# Patient Record
Sex: FEMALE | Race: WHITE | Employment: OTHER | URBAN - METROPOLITAN AREA
[De-identification: names, ages, dates, MRNs, and addresses within clinical notes are randomized per-mention and may not be internally consistent; named-entity substitution may affect disease eponyms.]

---

## 2017-11-17 ENCOUNTER — OFFICE VISIT (OUTPATIENT)
Dept: ENT CLINIC | Age: 67
End: 2017-11-17

## 2017-11-17 VITALS
WEIGHT: 169.2 LBS | DIASTOLIC BLOOD PRESSURE: 59 MMHG | BODY MASS INDEX: 27.19 KG/M2 | SYSTOLIC BLOOD PRESSURE: 104 MMHG | HEIGHT: 66 IN | HEART RATE: 82 BPM

## 2017-11-17 DIAGNOSIS — H83.09 LABYRINTHITIS, UNSPECIFIED LATERALITY: Primary | ICD-10-CM

## 2017-11-17 PROCEDURE — 1090F PRES/ABSN URINE INCON ASSESS: CPT | Performed by: OTOLARYNGOLOGY

## 2017-11-17 PROCEDURE — 99203 OFFICE O/P NEW LOW 30 MIN: CPT | Performed by: OTOLARYNGOLOGY

## 2017-11-17 PROCEDURE — G8419 CALC BMI OUT NRM PARAM NOF/U: HCPCS | Performed by: OTOLARYNGOLOGY

## 2017-11-17 PROCEDURE — G8427 DOCREV CUR MEDS BY ELIG CLIN: HCPCS | Performed by: OTOLARYNGOLOGY

## 2017-11-17 PROCEDURE — G8400 PT W/DXA NO RESULTS DOC: HCPCS | Performed by: OTOLARYNGOLOGY

## 2017-11-17 PROCEDURE — 1123F ACP DISCUSS/DSCN MKR DOCD: CPT | Performed by: OTOLARYNGOLOGY

## 2017-11-17 PROCEDURE — 4040F PNEUMOC VAC/ADMIN/RCVD: CPT | Performed by: OTOLARYNGOLOGY

## 2017-11-17 PROCEDURE — 3014F SCREEN MAMMO DOC REV: CPT | Performed by: OTOLARYNGOLOGY

## 2017-11-17 PROCEDURE — 1036F TOBACCO NON-USER: CPT | Performed by: OTOLARYNGOLOGY

## 2017-11-17 PROCEDURE — G8484 FLU IMMUNIZE NO ADMIN: HCPCS | Performed by: OTOLARYNGOLOGY

## 2017-11-17 PROCEDURE — 3017F COLORECTAL CA SCREEN DOC REV: CPT | Performed by: OTOLARYNGOLOGY

## 2017-11-17 RX ORDER — METHYLPREDNISOLONE 4 MG/1
TABLET ORAL
Qty: 1 KIT | Refills: 0 | Status: SHIPPED | OUTPATIENT
Start: 2017-11-17 | End: 2017-11-23

## 2017-11-17 NOTE — PROGRESS NOTES
The patient is a 71-year-old recently treated for urinary tract infection with Bactrim. There had been some associated respiratory infection such as green postnasal drainage. The symptoms resolve but she is now left with a sore sensation in the throat associated with lightheadedness. There is been no loss of consciousness but rather sensation of autophony. She denies any significant room spinning vertigo, change in vision, severe headache. The patient is a non smoker and is not exposed to second hand smoke or irritants. There have been no known contagious contacts. There are  no other symptoms referable to the upper aerodigestive tract. Her current medications include Lipitor, Prozac, Aldactone    Review of systems is negative except for the history of the present illness    GENERAL:   The patient appears well developed and well nourished. The head is normocephalic and atraumatic; no facial scars or weakness are noted. The facial skeleton is normal.The voice has a normal quality and tonality to it. EARS:  The pinnae are normal bilaterally. The ear canals are clear with no cerumen or narrowing. Both eardrums are normal with good aeration of the middle ear space. There is no evidence of attic retraction pocket or cholesteatoma. The umbo and light reflex appear normal.     EYES:   The conjunctivae and lids appear normal. There is full extraocular movement                   JAWS/DENTITION:  The dentition is grossly normal, including the gingiva. SALIVARY GLANDS:  The parotid and submandibular glands are normal in appearance. There are no palpable stones or masses. Clear secretions emanate from both Derick's and [place]'s ducts. There is no periglandular adenopathy. NASAL:  The external nose including the dorsum, columella, alae, and nasion is unremarkable. The turbinates appear normal. The middle and inferior meatuses appeared clear.  No polyps, ulceration, or mass are visualized either naris. The nasal septum is midline    NASOPHARYNX:  The mirror exam of the nasopharynx shows no mucosal disease or obstruction. ORAL/PHARYNGEAL:   No abnormal dryness or discrete mucosal lesions are seen at the lips, oral cavity, or oropharynx. There is full tongue mobility, and the fungiform and vallate papillae appear normal. The floor of the mouth is unremarkable. . The palatoglossal and palatopharyngeal folds, uvula, and soft palate do not obstruct the oropharynx. NECK:  The neck is supple with full range of motion. The bony and cartilaginous landmarks are normal to palpation. There is no suspicious mass or adenopathy. The thyroid gland is normal to palpation, and the trachea is midline. CHEST/PULMONARY: Effort normal, no stridor. Not tachypneic. No respiratory distress    NEURO: The patient is alert and oriented. The cranial nerves are intact. SKIN: Warm and dry; no pallor    PSYCHIATRIC: Psychiatric: There is a normal mood and affect.  Behavior is normal. Judgment and thought content normal.     Impression:  No suggestion of acute respiratory infection  Suspicion for labyrinthitis, possibly viral    Plan:  Adequate rest and fluids discussed  Medrol Dosepak  Check back in 1 week for follow-up

## 2017-11-21 ENCOUNTER — TELEPHONE (OUTPATIENT)
Dept: ENT CLINIC | Age: 67
End: 2017-11-21

## 2017-11-21 NOTE — TELEPHONE ENCOUNTER
1.  These are not symptoms that I should treat without examining her    2.   Since there was tightness in her chest, it might be best to check with her primary care physician in this regard

## 2017-11-21 NOTE — TELEPHONE ENCOUNTER
Patient was seen in office last Friday 11/17/17. .patient was prescribed a medrol pack. Patient called today for an update to let us know that her sore throat is better but she now has green mucus, headaches, chest tightness, cough, and bilateral ear pain. Please advise. Rafa Moon  Pharmacy confirmed in chart

## 2017-11-21 NOTE — TELEPHONE ENCOUNTER
I spoke to pt and advice her :    1. These are not symptoms that I should treat without examining her     2.   Since there was tightness in her chest, it might be best to check with her primary care physician in this regard    Patient agreed

## 2018-03-23 PROBLEM — K57.32 DIVERTICULITIS LARGE INTESTINE W/O PERFORATION OR ABSCESS W/O BLEEDING: Status: ACTIVE | Noted: 2018-03-23

## 2018-06-20 ENCOUNTER — OFFICE VISIT (OUTPATIENT)
Dept: ENT CLINIC | Age: 68
End: 2018-06-20

## 2018-06-20 VITALS — SYSTOLIC BLOOD PRESSURE: 120 MMHG | DIASTOLIC BLOOD PRESSURE: 70 MMHG

## 2018-06-20 DIAGNOSIS — H81.09 LABYRINTHINE VERTIGO, UNSPECIFIED LATERALITY: ICD-10-CM

## 2018-06-20 DIAGNOSIS — M79.2 NEURITIS: Primary | ICD-10-CM

## 2018-06-20 PROCEDURE — 3017F COLORECTAL CA SCREEN DOC REV: CPT | Performed by: OTOLARYNGOLOGY

## 2018-06-20 PROCEDURE — 1123F ACP DISCUSS/DSCN MKR DOCD: CPT | Performed by: OTOLARYNGOLOGY

## 2018-06-20 PROCEDURE — G8419 CALC BMI OUT NRM PARAM NOF/U: HCPCS | Performed by: OTOLARYNGOLOGY

## 2018-06-20 PROCEDURE — G8400 PT W/DXA NO RESULTS DOC: HCPCS | Performed by: OTOLARYNGOLOGY

## 2018-06-20 PROCEDURE — 1090F PRES/ABSN URINE INCON ASSESS: CPT | Performed by: OTOLARYNGOLOGY

## 2018-06-20 PROCEDURE — 1036F TOBACCO NON-USER: CPT | Performed by: OTOLARYNGOLOGY

## 2018-06-20 PROCEDURE — 4040F PNEUMOC VAC/ADMIN/RCVD: CPT | Performed by: OTOLARYNGOLOGY

## 2018-06-20 PROCEDURE — 99214 OFFICE O/P EST MOD 30 MIN: CPT | Performed by: OTOLARYNGOLOGY

## 2018-06-20 PROCEDURE — G8427 DOCREV CUR MEDS BY ELIG CLIN: HCPCS | Performed by: OTOLARYNGOLOGY

## 2018-06-20 RX ORDER — GABAPENTIN 300 MG/1
300 CAPSULE ORAL 2 TIMES DAILY
Qty: 30 CAPSULE | Refills: 0 | Status: SHIPPED | OUTPATIENT
Start: 2018-06-20 | End: 2020-01-13

## 2020-01-13 ENCOUNTER — OFFICE VISIT (OUTPATIENT)
Dept: ENT CLINIC | Age: 70
End: 2020-01-13
Payer: MEDICARE

## 2020-01-13 VITALS — HEART RATE: 88 BPM | DIASTOLIC BLOOD PRESSURE: 62 MMHG | OXYGEN SATURATION: 96 % | SYSTOLIC BLOOD PRESSURE: 100 MMHG

## 2020-01-13 PROCEDURE — G8400 PT W/DXA NO RESULTS DOC: HCPCS | Performed by: OTOLARYNGOLOGY

## 2020-01-13 PROCEDURE — 3017F COLORECTAL CA SCREEN DOC REV: CPT | Performed by: OTOLARYNGOLOGY

## 2020-01-13 PROCEDURE — 4040F PNEUMOC VAC/ADMIN/RCVD: CPT | Performed by: OTOLARYNGOLOGY

## 2020-01-13 PROCEDURE — G8421 BMI NOT CALCULATED: HCPCS | Performed by: OTOLARYNGOLOGY

## 2020-01-13 PROCEDURE — 1123F ACP DISCUSS/DSCN MKR DOCD: CPT | Performed by: OTOLARYNGOLOGY

## 2020-01-13 PROCEDURE — G8484 FLU IMMUNIZE NO ADMIN: HCPCS | Performed by: OTOLARYNGOLOGY

## 2020-01-13 PROCEDURE — G8427 DOCREV CUR MEDS BY ELIG CLIN: HCPCS | Performed by: OTOLARYNGOLOGY

## 2020-01-13 PROCEDURE — 99213 OFFICE O/P EST LOW 20 MIN: CPT | Performed by: OTOLARYNGOLOGY

## 2020-01-13 PROCEDURE — 1090F PRES/ABSN URINE INCON ASSESS: CPT | Performed by: OTOLARYNGOLOGY

## 2020-01-13 PROCEDURE — 1036F TOBACCO NON-USER: CPT | Performed by: OTOLARYNGOLOGY

## 2020-01-13 RX ORDER — METHYLPREDNISOLONE 4 MG/1
4 TABLET ORAL SEE ADMIN INSTRUCTIONS
Qty: 1 KIT | Refills: 0 | Status: SHIPPED | OUTPATIENT
Start: 2020-01-13 | End: 2020-09-17 | Stop reason: ALTCHOICE

## 2020-01-13 RX ORDER — AZITHROMYCIN 250 MG/1
TABLET, FILM COATED ORAL
Qty: 1 PACKET | Refills: 0 | Status: SHIPPED | OUTPATIENT
Start: 2020-01-13 | End: 2020-09-17 | Stop reason: ALTCHOICE

## 2020-01-13 RX ORDER — BUTALBITAL, ACETAMINOPHEN AND CAFFEINE 50; 325; 40 MG/1; MG/1; MG/1
1 TABLET ORAL EVERY 4 HOURS PRN
Qty: 30 TABLET | Refills: 0 | Status: SHIPPED | OUTPATIENT
Start: 2020-01-13

## 2020-01-24 ENCOUNTER — TELEPHONE (OUTPATIENT)
Dept: ENT CLINIC | Age: 70
End: 2020-01-24

## 2020-01-24 RX ORDER — AMOXICILLIN AND CLAVULANATE POTASSIUM 875; 125 MG/1; MG/1
1 TABLET, FILM COATED ORAL 2 TIMES DAILY
Qty: 20 TABLET | Refills: 0 | Status: SHIPPED | OUTPATIENT
Start: 2020-01-24 | End: 2020-09-17 | Stop reason: ALTCHOICE

## 2020-01-24 NOTE — TELEPHONE ENCOUNTER
Patient called with an update from her antibiotic and steroids. Patient says her right ear is still very painful.  Please advise

## 2020-09-17 ENCOUNTER — OFFICE VISIT (OUTPATIENT)
Dept: ORTHOPEDIC SURGERY | Age: 70
End: 2020-09-17
Payer: MEDICARE

## 2020-09-17 VITALS — HEIGHT: 66 IN | TEMPERATURE: 97.5 F | BODY MASS INDEX: 25.71 KG/M2 | WEIGHT: 160 LBS

## 2020-09-17 PROCEDURE — 1090F PRES/ABSN URINE INCON ASSESS: CPT | Performed by: PODIATRIST

## 2020-09-17 PROCEDURE — 99203 OFFICE O/P NEW LOW 30 MIN: CPT | Performed by: PODIATRIST

## 2020-09-17 PROCEDURE — G8400 PT W/DXA NO RESULTS DOC: HCPCS | Performed by: PODIATRIST

## 2020-09-17 PROCEDURE — G8427 DOCREV CUR MEDS BY ELIG CLIN: HCPCS | Performed by: PODIATRIST

## 2020-09-17 PROCEDURE — G8417 CALC BMI ABV UP PARAM F/U: HCPCS | Performed by: PODIATRIST

## 2020-09-17 PROCEDURE — 3017F COLORECTAL CA SCREEN DOC REV: CPT | Performed by: PODIATRIST

## 2020-09-17 PROCEDURE — 1036F TOBACCO NON-USER: CPT | Performed by: PODIATRIST

## 2020-09-17 PROCEDURE — L3260 AMBULATORY SURGICAL BOOT EAC: HCPCS | Performed by: PODIATRIST

## 2020-09-17 PROCEDURE — 1123F ACP DISCUSS/DSCN MKR DOCD: CPT | Performed by: PODIATRIST

## 2020-09-17 PROCEDURE — 4040F PNEUMOC VAC/ADMIN/RCVD: CPT | Performed by: PODIATRIST

## 2020-09-17 RX ORDER — PREDNISONE 10 MG/1
TABLET ORAL
Qty: 26 TABLET | Refills: 0 | Status: SHIPPED | OUTPATIENT
Start: 2020-09-17

## 2020-09-17 NOTE — PROGRESS NOTES
HISTORY OF PRESENT ILLNESS:  This is an intial visit for a 68-year-old female with a chief complaint of pain to the top of the left foot. She has been having pain for the past several years on and off but within the last 6 months it has become much more consistent. She has her greatest pain typically at night and as the day progresses. No history of trauma is related. The pain is worsened with activity and relieved with rest. It is described as mostly a dull achy type pain but can be sharper at times. She has tried Tylenol for this but it has not provided any long-lasting relief. She also complains of occasional pain to her left foot bunion. She has had that for several years. She states that it creates a burning type pain. FAMILY HISTORY:  Documented in chart. SOCIAL HISTORY:  Documented in chart. REVIEW OF SYSTEMS: The patient denies any fever, chills, or night sweats. The patient also denies developing any type of rash. The patient denies any problems with cardiovascular, pulmonary, gastrointestinal, neurologic, urologic, genitourinary, psychiatric, dermatologic, and HEENT systems. PHYSICAL EXAM:  The majority of palpable tenderness is over the dorsal central aspect of the left midfoot. There is very mild edema present. There is no erythema or ecchymosis present. She has a prominent hallux valgus deformity of the left foot. There is mild palpable tenderness at the medial eminence of the first MTP. The patient has palpable pedal pulses bilateral.  The sensation is grossly intact bilateral.    X-RAYS: 3 weightbearing views of the left foot were obtained. Severe osteoarthritic changes are noted at the TMT joint level. This is mainly around the second TMT joint. She has moderate to severe increase in the first metatarsal angle with moderate hallux valgus. ASSESSMENT:  Midfoot synovitis and Osteoarthritis, left foot. Hallux valgus, left foot.     PLAN:  I educated the patient on the pathology and its treatment options. A postop shoe and temporary arch support was applied to the patient's left foot. All weightbearing activity is to be performed in the boot. Overall activity is to be decreased. A prednisone 10 mg taper was prescribed for the short-term. I explained to the patient that this is essentially an overuse type injury secondary to the activity level, foot function, and weight. I discussed the recurrent nature of the episodes of pain and chronic nature of this problem. Both short and long term treatment was discussed. I'll see him back in approximately 3 weeks for reevaluation. Procedures    Darco Post-op Shoe Brace     Patient was prescribed a Darco Post-Op Shoe. The left foot will require stabilization / immobilization from this semi-rigid / rigid orthosis to improve their function. The orthosis will assist in protecting the affected area, provide functional support and facilitate healing. Patient was instructed to progress ambulation weight bearing as tolerated in the device. The patient was educated and fit by a healthcare professional with expert knowledge and specialization in brace application while under the direct supervision of the treating physician. Verbal and written instructions for the use of and application of this item were provided. They were instructed to contact the office immediately should the brace result in increased pain, decreased sensation, increased swelling or worsening of the condition.

## 2020-10-08 ENCOUNTER — OFFICE VISIT (OUTPATIENT)
Dept: ORTHOPEDIC SURGERY | Age: 70
End: 2020-10-08
Payer: MEDICARE

## 2020-10-08 VITALS — HEIGHT: 65 IN | WEIGHT: 165 LBS | BODY MASS INDEX: 27.49 KG/M2

## 2020-10-08 PROCEDURE — 1123F ACP DISCUSS/DSCN MKR DOCD: CPT | Performed by: PODIATRIST

## 2020-10-08 PROCEDURE — G8484 FLU IMMUNIZE NO ADMIN: HCPCS | Performed by: PODIATRIST

## 2020-10-08 PROCEDURE — 1036F TOBACCO NON-USER: CPT | Performed by: PODIATRIST

## 2020-10-08 PROCEDURE — G8427 DOCREV CUR MEDS BY ELIG CLIN: HCPCS | Performed by: PODIATRIST

## 2020-10-08 PROCEDURE — G8417 CALC BMI ABV UP PARAM F/U: HCPCS | Performed by: PODIATRIST

## 2020-10-08 PROCEDURE — G8400 PT W/DXA NO RESULTS DOC: HCPCS | Performed by: PODIATRIST

## 2020-10-08 PROCEDURE — 3017F COLORECTAL CA SCREEN DOC REV: CPT | Performed by: PODIATRIST

## 2020-10-08 PROCEDURE — L3040 FT ARCH SUPRT PREMOLD LONGIT: HCPCS | Performed by: PODIATRIST

## 2020-10-08 PROCEDURE — 4040F PNEUMOC VAC/ADMIN/RCVD: CPT | Performed by: PODIATRIST

## 2020-10-08 PROCEDURE — 99213 OFFICE O/P EST LOW 20 MIN: CPT | Performed by: PODIATRIST

## 2020-10-08 PROCEDURE — 1090F PRES/ABSN URINE INCON ASSESS: CPT | Performed by: PODIATRIST

## 2021-07-08 ENCOUNTER — OFFICE VISIT (OUTPATIENT)
Dept: ENT CLINIC | Age: 71
End: 2021-07-08
Payer: MEDICARE

## 2021-07-08 VITALS — DIASTOLIC BLOOD PRESSURE: 63 MMHG | SYSTOLIC BLOOD PRESSURE: 110 MMHG | HEART RATE: 80 BPM

## 2021-07-08 DIAGNOSIS — R42 DIZZINESS: ICD-10-CM

## 2021-07-08 DIAGNOSIS — M26.609 TMJ (TEMPOROMANDIBULAR JOINT SYNDROME): Chronic | ICD-10-CM

## 2021-07-08 DIAGNOSIS — J01.90 ACUTE RHINOSINUSITIS: Primary | Chronic | ICD-10-CM

## 2021-07-08 DIAGNOSIS — H92.01 OTALGIA OF RIGHT EAR: Chronic | ICD-10-CM

## 2021-07-08 DIAGNOSIS — R49.0 HOARSENESS OF VOICE: Chronic | ICD-10-CM

## 2021-07-08 PROCEDURE — G8400 PT W/DXA NO RESULTS DOC: HCPCS | Performed by: OTOLARYNGOLOGY

## 2021-07-08 PROCEDURE — 4040F PNEUMOC VAC/ADMIN/RCVD: CPT | Performed by: OTOLARYNGOLOGY

## 2021-07-08 PROCEDURE — 1090F PRES/ABSN URINE INCON ASSESS: CPT | Performed by: OTOLARYNGOLOGY

## 2021-07-08 PROCEDURE — G8417 CALC BMI ABV UP PARAM F/U: HCPCS | Performed by: OTOLARYNGOLOGY

## 2021-07-08 PROCEDURE — 1123F ACP DISCUSS/DSCN MKR DOCD: CPT | Performed by: OTOLARYNGOLOGY

## 2021-07-08 PROCEDURE — G8427 DOCREV CUR MEDS BY ELIG CLIN: HCPCS | Performed by: OTOLARYNGOLOGY

## 2021-07-08 PROCEDURE — 99214 OFFICE O/P EST MOD 30 MIN: CPT | Performed by: OTOLARYNGOLOGY

## 2021-07-08 PROCEDURE — 3017F COLORECTAL CA SCREEN DOC REV: CPT | Performed by: OTOLARYNGOLOGY

## 2021-07-08 PROCEDURE — 1036F TOBACCO NON-USER: CPT | Performed by: OTOLARYNGOLOGY

## 2021-07-08 RX ORDER — CEFUROXIME AXETIL 250 MG/1
250 TABLET ORAL 2 TIMES DAILY
Qty: 28 TABLET | Refills: 0 | Status: SHIPPED | OUTPATIENT
Start: 2021-07-08 | End: 2021-07-22

## 2021-07-08 RX ORDER — CLONAZEPAM 0.5 MG/1
TABLET ORAL
COMMUNITY
Start: 2021-06-18

## 2021-07-08 ASSESSMENT — ENCOUNTER SYMPTOMS
VOICE CHANGE: 1
RHINORRHEA: 0
SINUS PAIN: 1
TROUBLE SWALLOWING: 0
SORE THROAT: 0

## 2021-07-08 NOTE — PROGRESS NOTES
[facility]       PCP:  Nunu Toney MD      [facility]  Chief Complaint   Patient presents with    Ear Problem     right ear hurts when laying down to sleep       [id number] Darlene is a 70 y.o. female here for evaluation and treatment of otalgia. Right ear ache, worse when I lay down. Get dizzy, lose balance a lot, I've fallen twice in two months. Feel tired in my head. going on for about two months. REVIEW OF SYSTEMS   Review of Systems   Constitutional: Negative for chills, fever and unexpected weight change. HENT: Positive for ear pain (right ), hearing loss (right ear, for about two months), sinus pain (right frontal) and voice change (\"hoarse and raspy for a couple months\"). Negative for congestion, ear discharge, rhinorrhea, sore throat, tinnitus and trouble swallowing. Neurological: Positive for dizziness and headaches (right forehead). PAST MEDICAL HISTORY    Past Medical History:   Diagnosis Date    Anxiety     Depression     GERD (gastroesophageal reflux disease)     Hyperlipidemia     Hypertension     Kidney stone     Osteoarthritis     Type II or unspecified type diabetes mellitus without mention of complication, not stated as uncontrolled          Past Surgical History:   Procedure Laterality Date    APPENDECTOMY      HYSTERECTOMY      LAP BAND            EXAMINATION      Vitals:    07/08/21 1521   BP: 110/63   Site: Left Upper Arm   Position: Sitting   Cuff Size: Medium Adult   Pulse: 80          Physical Exam  Vitals reviewed. Constitutional:       General: She is awake. She is not in acute distress. Appearance: Normal appearance. She is well-developed. She is not ill-appearing or toxic-appearing. HENT:      Head: Normocephalic and atraumatic. Salivary Glands: Right salivary gland is not diffusely enlarged or tender.  Left salivary gland is not diffusely enlarged or tender. Right Ear: Tympanic membrane, ear canal and external ear normal.      Left Ear: Tympanic membrane, ear canal and external ear normal.      Ears:      Comments: ( + ) Bilateral otomicroscopy performed. Nose: Nose normal. No nasal deformity, septal deviation, mucosal edema, congestion or rhinorrhea. Right Turbinates: Not enlarged. Left Turbinates: Not enlarged. Right Sinus: No maxillary sinus tenderness or frontal sinus tenderness. Left Sinus: No maxillary sinus tenderness or frontal sinus tenderness. Mouth/Throat:      Lips: Pink. No lesions. Mouth: Mucous membranes are moist. No oral lesions. Tongue: No lesions. Palate: No mass and lesions. Pharynx: Oropharynx is clear. Uvula midline. No oropharyngeal exudate or posterior oropharyngeal erythema. Tonsils: No tonsillar exudate or tonsillar abscesses. Neck:      Thyroid: No thyroid mass, thyromegaly or thyroid tenderness. Trachea: No tracheal deviation. Musculoskeletal:      Cervical back: Normal range of motion and neck supple. No rigidity. No muscular tenderness. Lymphadenopathy:      Cervical: No cervical adenopathy. Neurological:      Mental Status: She is alert. Sana Jimenez / Zee Petty / Tyrone Pérez was seen today for ear problem. Diagnoses and all orders for this visit:    Acute rhinosinusitis  -     cefUROXime (CEFTIN) 250 MG tablet; Take 1 tablet by mouth 2 times daily for 14 days    Otalgia of right ear    TMJ (temporomandibular joint syndrome)    Hoarseness of voice    Dizziness           RECOMMENDATIONS/PLAN      1. Acetaminophen (eg. Tylenol) or Ibuprofen (eg. Advil) (over the counter medications) as needed for pain. 2. See Patient Instructions on file for this visit, which were discussed with the patient. 3. Return in about 1 month (around 8/8/2021) for recheck/follow-up, and sooner if condition worsens.

## 2021-07-08 NOTE — PATIENT INSTRUCTIONS
RHINOSINUSITIS CARE  · Take Probiotic while you are taking antibiotics, to prevent diarrhea, stomach upset, pseudomembranous colitis, and C. difficile diarrhea. This may be obtained at your pharmacy or health food store. Alternatively, you may eat one cup of yogurt with active or live cultures twice daily while taking the antibiotic. Continue for two to three days after completion of the antibiotic. · Use a 12 hour decongestant spray, 0.05% oxymetazoline (e.g. Afrin, Duration, 4-Way). Spray each nostril twice three times a day for three days, then two times a day for 2 days, and then stop for two days and then repeat the cycle once. · Take one Mucinex-D (red orange box) maximum strength tablet each morning and one Mucinex (blue box) maximum strength tablet each evening, about 12 hours later, daily for the next ten days. Take only is approved by your PCP regarding high blood pressure. · Use fluticasone 2 sprays in each nostril once daily. · It may take several days to several weeks for your sinusitis to clear up. It is important to take all your medications as prescribed. Please continue your antibiotics as prescribed. · Please call the office if your condition worsens or if symptoms persist after treatment is completed.        ===================================================================        INSTRUCTIONS FOR TMJ (TEMPOROMANDIBULAR JOINT DISORDER)  · Eat a soft diet, with no tough meat for ten days and then prn TMJ pain. · Do not chew gum. · Heating Pad Therapy:  Use a heating pad on medium heat to the TMJ area for about 30 to 45 minutes, with a one minute break every five minutes, three times daily. · If ear pain remains uncontrolled, return to office, and we will consider other measures, including possible CT or MRI scans, endoscopy of the throat and possible biopsies, and/or referral to a dental TMJ specialist oral surgeon for bite splint. ========================================================================        ADVERSE AND SIDE EFFECTS OF MEDICATIONS:    Please be aware of the following possible adverse reactions, side effects, and complications of the following medications, including, but not limited to: allergic reaction, interactions with other medications, nausea, headache, diarrhea, persistent symptoms, failure to improve, and the following:     cefuroxime (antibiotic):  diarrhea, colitis (severe infection and inflammation of the large intestine), pseudomembranous colitis (severe infection and inflammation of the colon, usually due to C. difficile bacteria)  yeast or fungal  infections, Smith-Enmanuel syndrome (very rare necrotic skin reaction with peeling of skin, blisters and arthritis), persistent symptoms/infection, and failure to improve. Fluticasone:  nosebleed, burning sensation, atrophy of nasal mucosa, septal ulceration or perforation, nasal irritation, nasal yeast infection,  drowsiness, bad taste. Taking Probiotic while you are taking antibiotics, may help to prevent diarrhea, stomach upset, pseudomembranous colitis, and C. difficile diarrhea. This may be obtained at your pharmacy or health food store. Alternatively, you may eat one cup of yogurt with active or live cultures twice daily while taking the antibiotic. Continue for two to three days after completion of the antibiotic.    ~~~>>> Also please read the medication information in this AVS and all information given to you by the pharmacist.           Patient Education        cefuroxime (oral/injection)  Pronunciation:  SEF ue MAX eem  What is the most important information I should know about cefuroxime? You should not use cefuroxime if you have ever had a severe allergic reaction to any type of cephalosporin antibiotic (Omnicef, Keflex, and others). What is cefuroxime?   Cefuroxime is a cephalosporin (SEF a low spor in) antibiotic that is used to treat bacterial infections of the ear, nose, throat, lungs, skin, bones, joints, bladder, or kidneys. Cefuroxime is also used to treat gonorrhea, meningitis, sepsis, or early Lyme disease. Cefuroxime injection is sometimes given just before a surgery to prevent infection. Cefuroxime may also be used for purposes not listed in this medication guide. What should I discuss with my healthcare provider before using cefuroxime? You should not use this medicine if you have an allergy to any type of penicillin, or if you are allergic to cefuroxime or any other cephalosporin antibiotic (cefdinir, cefalexin, Keflex, Omnicef, and others). Tell your doctor if you have ever had:  · a stomach or intestinal disorder such as colitis;  · kidney disease;  · liver disease; or  · if you are malnourished. Tell your doctor if you are pregnant or breastfeeding. Some antibiotics can make birth control pills less effective. Ask your doctor about using a non-oral birth control to prevent pregnancy. This includes injections, implants, skin patches, vaginal rings, condom, diaphragm, cervical cap, or contraceptive sponge. Cefuroxime is not approved for use by anyone younger than 1 months old. How should I use cefuroxime? Follow all directions on your prescription label and read all medication guides or instruction sheets. Use the medicine exactly as directed. Cefuroxime oral is taken by mouth. Cefuroxime oral  may be given as a single dose to treat gonorrhea. For most other infections, cefuroxime oral is usually given for 7 to 10 days, or for 20 days to treat early Lyme disease. Follow your doctor's dosing instructions very carefully. Swallow the tablet whole and do not crush, chew, or break it. You may take cefuroxime oral with or without food. Tell your doctor if a child taking cefuroxime oral cannot swallow a tablet whole. Cefuroxime injection is injected into a muscle, or as an infusion into a vein.  A healthcare provider will medicine. What are the possible side effects of cefuroxime? Get emergency medical help if you have signs of an allergic reaction (hives, difficult breathing, swelling in your face or throat) or a severe skin reaction (fever, sore throat, burning in your eyes, skin pain, red or purple skin rash that spreads and causes blistering and peeling). Call your doctor at once if you have:  · severe stomach pain, diarrhea that is watery or bloody (even if it occurs months after your last dose);  · jaundice (yellowing of the skin or eyes);  · fever, chills, headache, muscle pain;  · light-headed feeling, like you might pass out;  · problems with your hearing;  · a seizure; or  · kidney problems --little or no urination, painful or difficult urination, swelling in your feet or ankles, feeling tired or short of breath. Common side effects may include:  · diarrhea;  · fever and body aches;  · nausea, vomiting; or  · vaginal itching or discharge. This is not a complete list of side effects and others may occur. Call your doctor for medical advice about side effects. You may report side effects to FDA at 9-606-FDA-6787. What other drugs will affect cefuroxime? Tell your doctor about all your other medicines, especially:  · probenecid;  · a diuretic or \"water pill\";  · a blood thinner --warfarin, Coumadin, Lorice Boots; or  · a stomach acid reducer --esomeprazole, famotidine, Nexium, Pepcid, Prevacid, Prilosec, Tagamet, Zantac, and others. This list is not complete. Other drugs may affect cefuroxime, including prescription and over-the-counter medicines, vitamins, and herbal products. Not all possible drug interactions are listed here. Where can I get more information? Your pharmacist can provide more information about cefuroxime. Remember, keep this and all other medicines out of the reach of children, never share your medicines with others, and use this medication only for the indication prescribed.    Every effort has been made to ensure that the information provided by [address] is accurate, up-to-date, and complete, but no guarantee is made to that effect. Drug information contained herein may be time sensitive. [facility] information has been compiled for use by healthcare practitioners and consumers in the [place] and therefore [facility] does not warrant that uses outside of the [place] are appropriate, unless specifically indicated otherwise. [facility]'s drug information does not endorse drugs, diagnose patients or recommend therapy. [facility]'s drug information is an informational resource designed to assist licensed healthcare practitioners in caring for their patients and/or to serve consumers viewing this service as a supplement to, and not a substitute for, the expertise, skill, knowledge and judgment of healthcare practitioners. The absence of a warning for a given drug or drug combination in no way should be construed to indicate that the drug or drug combination is safe, effective or appropriate for any given patient. [facility] does not assume any responsibility for any aspect of healthcare administered with the aid of information [facility] provides. The information contained herein is not intended to cover all possible uses, directions, precautions, warnings, drug interactions, allergic reactions, or adverse effects. If you have questions about the drugs you are taking, check with your doctor, nurse or pharmacist.  Copyright 0616-5692 [address]. Version: 9.01. Revision date: 1/4/2021. Care instructions adapted under license by [facility] ([facility]). If you have questions about a medical condition or this instruction, always ask your healthcare professional. [place] [address] any warranty or liability for your use of this information.          Patient Education        fluticasone nasal  Pronunciation:  art perea  Brand:  Gema Dugan Dyanne Rover  What is the most important information I should know about fluticasone nasal?  Follow all directions on your medicine label and package. Tell each of your healthcare providers about all your medical conditions, allergies, and all medicines you use. What is fluticasone nasal?  Fluticasone nasal (for the nose) is a steroid medicine that is used to treat nasal congestion, sneezing, runny nose, and itchy or watery eyes caused by seasonal or year-round allergies. The Nela brand of this medicine is for use only in adults. Veramyst may be used in children as young as 3years old. Flonase is for use in adults and children who are at least 3years old. Fluticasone nasal may also be used for purposes not listed in this medication guide. What should I discuss with my healthcare provider before using fluticasone nasal?  You should not use fluticasone nasal if you are allergic to it. Fluticasone can weaken your immune system,  making it easier for you to get an infection or worsening an infection you already have or recently had. Tell your doctor about any illness or infection you have had within the past several weeks. Tell your doctor if you have ever had:  · sores or ulcers inside your nose;  · injury of or surgery on your nose;  · glaucoma or cataracts;  · liver disease;  · diabetes;  · a weak immune system; or  · any type of infection (bacterial, fungal, viral, or parasitic). If you use fluticasone nasal without a prescription and you have any medical conditions, ask a doctor or pharmacist if this medicine is safe for you. Tell your doctor if you are pregnant or breast-feeding. How should I use fluticasone nasal?  Follow all directions on your prescription label and read all medication guides or instruction sheets. Use the medicine exactly as directed. Do not share this medicine with another person, even if they have the same symptoms you have.   Your dose will depend on the fluticasone brand or strength you use, and your dose may change once your symptoms improve. Follow all dosing instructions very carefully. A child using the nasal spray should be supervised by an adult. Read and carefully follow any Instructions for Use provided with your medicine. Ask your doctor or pharmacist if you do not understand these instructions. Shake the nasal spray just before each use. If you switched to fluticasone from another steroid medicine, you should not stop using it suddenly. Follow your doctor's instructions about tapering your dose. It may take several days before your symptoms improve. Keep using the medication as directed and tell your doctor if your symptoms do not improve after a week of treatment. Store fluticasone nasal in an upright position at room temperature, away from moisture and heat. Throw the spray bottle away after you have used 120 sprays, even if there is still medicine left in the bottle. What happens if I miss a dose? Use the medicine as soon as you can, but skip the missed dose if it is almost time for your next dose. Do not use two doses at one time. What happens if I overdose? Seek emergency medical attention or call the Poison Help line at 1-393.310.8001. An overdose of fluticasone nasal is not expected to produce life threatening symptoms. Long term use of steroid medicine can lead to glaucoma, cataracts, thinning skin, easy bruising, changes in body fat (especially in your face, neck, back, and waist), increased acne or facial hair, menstrual problems, impotence, or loss of interest in sex. What should I avoid while using fluticasone nasal?  Avoid getting the spray in your eyes or mouth. If this does happen, rinse with water. Avoid being near people who are sick or have infections. Call your doctor for preventive treatment if you are exposed to chickenpox or measles.  These conditions can be serious or even fatal in people who are using fluticasone nasal.  What are the possible side effects of fluticasone nasal?  Get emergency medical help if you have signs of an allergic reaction: hives, rash; feeling light-headed; difficult breathing; swelling of your face, lips, tongue, or throat. Call your doctor at once if you have:  · severe or ongoing nosebleeds;  · noisy breathing, runny nose, or crusting around your nostrils;  · redness, sores, or white patches in your mouth or throat;  · fever, chills, body aches;  · blurred vision, eye pain, or seeing halos around lights;  · any wound that will not heal; or  · signs of a hormonal disorder --worsening tiredness or muscle weakness, feeling light-headed, nausea, vomiting. Steroid medicine can affect growth in children. Tell your doctor if your child is not growing at a normal rate while using this medicine. Common side effects may include:  · minor nosebleed, burning or itching in your nose;  · sores or white patches inside or around your nose;  · cough, trouble breathing;  · headache, back pain;  · sinus pain, sore throat, fever; or  · nausea, vomiting. This is not a complete list of side effects and others may occur. Call your doctor for medical advice about side effects. You may report side effects to FDA at 5-269-FDA-3341. What other drugs will affect fluticasone nasal?  Tell your doctor about all your other medicines, especially:  · antifungal medicine; or  · antiviral medicine to treat hepatis C or HIV/AIDS. This list is not complete. Other drugs may affect fluticasone nasal, including prescription and over-the-counter medicines, vitamins, and herbal products. Not all possible drug interactions are listed here. Where can I get more information? Your pharmacist can provide more information about fluticasone nasal.  Remember, keep this and all other medicines out of the reach of children, never share your medicines with others, and use this medication only for the indication prescribed.    Every effort has been made to ensure that the information provided by [address] [place] [address] Dr DENNISON ('[employer]') is accurate, up-to-date, and complete, but no guarantee is made to that effect. Drug information contained herein may be time sensitive. [employer] information has been compiled for use by healthcare practitioners and consumers in the [place] and therefore [employer] does not warrant that uses outside of the [place] are appropriate, unless specifically indicated otherwise. [employer]'s drug information does not endorse drugs, diagnose patients or recommend therapy. [employer]'s drug information is an informational resource designed to assist licensed healthcare practitioners in caring for their patients and/or to serve consumers viewing this service as a supplement to, and not a substitute for, the expertise, skill, knowledge and judgment of healthcare practitioners. The absence of a warning for a given drug or drug combination in no way should be construed to indicate that the drug or drug combination is safe, effective or appropriate for any given patient. [employer] does not assume any responsibility for any aspect of healthcare administered with the aid of information [employer] provides. The information contained herein is not intended to cover all possible uses, directions, precautions, warnings, drug interactions, allergic reactions, or adverse effects. If you have questions about the drugs you are taking, check with your doctor, nurse or pharmacist.  Copyright 2965-1340 [address]: 10.02. Revision date: 12/30/2019. Care instructions adapted under license by [facility] [address] If you have questions about a medical condition or this instruction, always ask your healthcare professional. [place] [address] any warranty or liability for your use of this information.